# Patient Record
Sex: MALE | Race: WHITE | NOT HISPANIC OR LATINO | Employment: FULL TIME | URBAN - METROPOLITAN AREA
[De-identification: names, ages, dates, MRNs, and addresses within clinical notes are randomized per-mention and may not be internally consistent; named-entity substitution may affect disease eponyms.]

---

## 2015-06-30 LAB
ANION GAP SERPL CALCULATED.3IONS-SCNC: NORMAL MMOL/L
BUN SERPL-MCNC: 13 MG/DL (ref 8–25)
CALCIUM SERPL-MCNC: 9.5 MG/DL (ref 8.5–10.5)
CHLORIDE SERPLBLD-SCNC: 105 MMOL/L (ref 98–110)
CHOLESTEROL TOTAL: 187 (ref 100–199)
CO2 SERPL-SCNC: 26 MMOL/L (ref 21–31)
CREAT SERPL-MCNC: 1.04 MG/DL (ref 0.72–1.25)
GFR SERPL CREATININE-BSD FRML MDRD: NORMAL ML/MIN/1.73M2
GLUCOSE SERPL-MCNC: 95 MG/DL (ref 65–100)
HDLC SERPL-MCNC: 35 MG/DL
LDL CHOLESTEROL: 116 MG/DL
POTASSIUM SERPL-SCNC: 4.2 MMOL/L (ref 3.5–5)
SODIUM SERPL-SCNC: 139 MMOL/L (ref 135–145)
TRIGL SERPL-MCNC: 181 MG/DL

## 2016-09-06 LAB
ALBUMIN SERPL-MCNC: 4.4 G/DL (ref 3.5–5.2)
ALP SERPL-CCNC: 61 U/L (ref 50–136)
ALT SERPL-CCNC: 15 U/L (ref 8–45)
ANION GAP SERPL CALCULATED.3IONS-SCNC: NORMAL MMOL/L
AST SERPL-CCNC: 18 U/L (ref 2–40)
BILIRUB SERPL-MCNC: 0.7 MG/DL (ref 0.2–3.7)
BUN SERPL-MCNC: 16 MG/DL (ref 8–25)
CALCIUM SERPL-MCNC: 9.3 MG/DL (ref 8.5–10.5)
CHLORIDE SERPLBLD-SCNC: 107 MMOL/L (ref 98–110)
CO2 SERPL-SCNC: 25 MMOL/L (ref 21–31)
CREAT SERPL-MCNC: 0.96 MG/DL (ref 0.72–1.25)
GFR SERPL CREATININE-BSD FRML MDRD: NORMAL ML/MIN/1.73M2
GLUCOSE SERPL-MCNC: 90 MG/DL (ref 65–100)
POTASSIUM SERPL-SCNC: 4.1 MMOL/L (ref 3.5–5)
PROT SERPL-MCNC: 7.2 G/DL (ref 6–8)
SODIUM SERPL-SCNC: 139 MMOL/L (ref 135–145)

## 2016-10-26 LAB
CK TOTAL: 78 U/L (ref 30–200)
HCT VFR BLD AUTO: 42.9 % (ref 37–53)
HEMOGLOBIN: 14.6 G/DL (ref 13.5–17.5)
PLATELET # BLD AUTO: 177 10^9/L (ref 140–440)
WBC # BLD AUTO: 7.5 10^9/L (ref 4.5–11)

## 2017-10-23 ENCOUNTER — TRANSFERRED RECORDS (OUTPATIENT)
Dept: HEALTH INFORMATION MANAGEMENT | Facility: CLINIC | Age: 52
End: 2017-10-23

## 2017-11-06 ENCOUNTER — TRANSFERRED RECORDS (OUTPATIENT)
Dept: HEALTH INFORMATION MANAGEMENT | Facility: CLINIC | Age: 52
End: 2017-11-06

## 2017-11-07 ENCOUNTER — CARE COORDINATION (OUTPATIENT)
Dept: CARDIOLOGY | Facility: CLINIC | Age: 52
End: 2017-11-07

## 2017-11-07 NOTE — PROGRESS NOTES
Call from  Antonette - patient on the line asking if we need to obtain records for visit with DR Yeung Friday 11/10/2017 - seeing as new patient for chest pain.  Asked for patient to be transferred and evaluated by RN.  Patient was transferred - spoke with patient a.  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 Patient states this has been going on since September 2016 and he was evaluated by stress test at [facility] but not seen by a cardiologist. ON review dyana states his chest is aches most of the time, has daily chest pain tightness in neck and acid reflux - he takes an aspirin and notices it eases. Patient states he feels full after eating, denies caffeine use, notes symptoms worse at night where he just feels bad and aches all over, no syncope or presyncope.  Patient states BP yesterday at [facility] Dr Sherman in [place] was 139/94 and abdominal ultrasound was ordered - he is awaiting those results. Patient states awareness of need to seek emergency room care as symptoms warrant and agrees to same.  Will obtain records from [facility] and from [facility].  Informed patient via message ie [facility]D needs JI to send records.   Erin Moreira RN 11/07/17 12:05 PM

## 2017-11-10 ENCOUNTER — OFFICE VISIT (OUTPATIENT)
Dept: CARDIOLOGY | Facility: CLINIC | Age: 52
End: 2017-11-10
Payer: COMMERCIAL

## 2017-11-10 VITALS
HEART RATE: 82 BPM | SYSTOLIC BLOOD PRESSURE: 115 MMHG | DIASTOLIC BLOOD PRESSURE: 75 MMHG | WEIGHT: 181.1 LBS | HEIGHT: 67 IN | OXYGEN SATURATION: 100 % | BODY MASS INDEX: 28.43 KG/M2

## 2017-11-10 DIAGNOSIS — R07.89 ATYPICAL CHEST PAIN: Primary | ICD-10-CM

## 2017-11-10 DIAGNOSIS — I10 ESSENTIAL HYPERTENSION: ICD-10-CM

## 2017-11-10 PROCEDURE — 93000 ELECTROCARDIOGRAM COMPLETE: CPT | Performed by: INTERNAL MEDICINE

## 2017-11-10 PROCEDURE — 99204 OFFICE O/P NEW MOD 45 MIN: CPT | Mod: 25 | Performed by: INTERNAL MEDICINE

## 2017-11-10 RX ORDER — LISINOPRIL 10 MG/1
10 TABLET ORAL DAILY
COMMUNITY

## 2017-11-10 NOTE — LETTER
11/10/2017    Alban Pepe MD  [facility]   [address] [place] [address]    RE: Lopez Vergara       Dear Colleague,    I had the pleasure of seeing Lopez Vergara in the [facility].    REFERRING PROVIDER:  Dr. Alban Pepe MD       REASON FOR VISIT:  Chest pain.      The patient is new to Cardiology.  In the past, he has been seen by Cardiology at [facility] and [facility].  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  He is a lifelong never smoker, his BMI is 28.6 kg/m2, and he has optimally treated hypertension.      1. Chest pain.      The patient has had on-and-off chest pain for several years.  However, he feels they have become more frequent and noticeable over the last few months.  He describes a left-sided chest discomfort that is associated with tingling in the back of his neck as well as discomfort in his lower back that can come on at rest, but also with activity.  He states that when he has to run across sports fields with his camera, he feels more aware of mild dyspnea now than he used to in the past.  Sometimes he gets this chest discomfort with running, at other times not.  The symptoms can last for a few minutes up to several hours or all night.  Pertinently, the patient appeared very anxious and appeared to be under considerable stress.  When I asked him directly whether he was stressed, he denied stressors at work, relationships, financial, etc.  However, notably, two of his male colleagues at work recently had stress tests, which were reportedly normal, and had non-fatal myocardial infarctions within a week of them. He is .      In 2013, the patient had a normal exercise echocardiogram in the [employer] [facility] at a high workload (greater than 10 METs).  In 09/2016, at [facility], he had a normal treadmill exercise test at 12 METs with normal blood pressure and heart rate  response and no ischemic changes.  Pertinently, the report states that he had chest pain before and during the test.      Risk factors:  Age 52 years, never smoker, nonobese (BMI 28.6 kg/m2), mild hypertension treated with 10 mg of lisinopril with a resting BP of 115/75 mmHg, not diabetic, satisfactory lipid profile with an LDL of 116, low HDL of 35, triglycerides of 181.  Normal TSH of 1.57.  He does not know his family history because he is adopted.  His resting ECG done today showed normal sinus rhythm at a rate of 68 BPM.      CURRENT MEDICATIONS:   1.  Lisinopril 10 mg daily.   2.  Omeprazole 40 mg daily.   3.  Ferrous sulfate 325 mg daily.      ALLERGIES:  Hives with insulin.      REVIEW OF SYSTEMS:  Please see my attached note in Epic for a detailed review of systems, past medical, surgical, social and family histories.        PHYSICAL EXAMINATION:   VITAL SIGNS:  /75, pulse 82 per minute, height 1.695 m, weight 82.1 kg, saturations 100% on room air, BMI 28.58 kg/m2.   GENERAL:  Remarkable for a visibly anxious affect.  During the consultation, he became more relaxed as he was more reassured of his symptoms.   CARDIOVASCULAR:  Normal heart sounds.  Normal apical impulse.  No murmur, no pericardial friction rub.   LUNGS:  Bilateral normal breath sounds.   EXTREMITIES:  No edema.        Please see my attached note in Epic for detailed exam findings.        DIAGNOSTIC DATA:     LABORATORY:  As documented in HPI.        Echo stress test in 2013, treadmill stress test in 09/2016:  As documented in HPI.      ECG done today:  Normal sinus rhythm.      DIAGNOSES:   1.  Atypical chest pain.   2.  Optimally treated hypertension.   3.  Dyslipidemia with a low HDL of 35.      ASSESSMENT AND PLAN:    Mr. Vergara's chest pain sounds atypical for coronary etiology.  It occurs at rest and with exercise, can last for hours. He has had negative high workload stress tests in 2013 and 2016.      RECOMMENDATIONS:    I had a  long conversation with the patient reiterating the symptoms of coronary artery disease, the role of stress testing, the role of coronary angiogram.  The patient wanted to know his absolute risk for future heart attacks, and I told him that currently we do not have a test that can predict this.  I have offered him an exercise nuclear stress test or a CT coronary angiogram.  The details of both tests, the pros and cons and the prognostic value were discussed.  The patient would like to think this through before proceeding.  I have given him the contact details of my nurse, Erin Salmon, and he will call us with how he would like to proceed.      It was my pleasure to visit with this patient.  I spent 40 minutes with him; greater than 50% of the time was spent in counseling and coordination of care.     Sincerely,    Marcus Yeung MD     [facility]
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
## 2017-11-10 NOTE — PATIENT INSTRUCTIONS
1. As we discussed, if you would like to proceed with the treadmill nuclear stress test or CT coronary angiogram, please call my nurse and we will set it up for you. My office will call you with the results.    If you have any questions or concerns, please call my nurse Erin Salmon at [phone].

## 2017-11-10 NOTE — PROGRESS NOTES
REFERRING PROVIDER:  Dr. Alban Pepe MD       REASON FOR VISIT:  Chest pain.      HISTORY OF PRESENT ILLNESS:    The patient is new to Cardiology.  In the past, he has been seen by Cardiology at [facility] and [facility].  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  He is a lifelong never smoker, his BMI is 28.6 kg/m2, and he has optimally treated hypertension.      1. Chest pain.      The patient has had on-and-off chest pain for several years.  However, he feels they have become more frequent and noticeable over the last few months.  He describes a left-sided chest discomfort that is associated with tingling in the back of his neck as well as discomfort in his lower back that can come on at rest, but also with activity.  He states that when he has to run across sports fields with his camera, he feels more aware of mild dyspnea now than he used to in the past.  Sometimes he gets this chest discomfort with running, at other times not.  The symptoms can last for a few minutes up to several hours or all night.  Pertinently, the patient appeared very anxious and appeared to be under considerable stress.  When I asked him directly whether he was stressed, he denied stressors at work, relationships, financial, etc.  However, notably, two of his male colleagues at work recently had stress tests, which were reportedly normal, and had non-fatal myocardial infarctions within a week of them. He is .      In 2013, the patient had a normal exercise echocardiogram in the [employer] system at a high workload (greater than 10 METs).  In 09/2016, at [facility], he had a normal treadmill exercise test at 12 METs with normal blood pressure and heart rate response and no ischemic changes.  Pertinently, the report states that he had chest pain before and during the test.      Risk factors:  Age 52 years, never smoker, nonobese (BMI 28.6 kg/m2), mild hypertension  treated with 10 mg of lisinopril with a resting BP of 115/75 mmHg, not diabetic, satisfactory lipid profile with an LDL of 116, low HDL of 35, triglycerides of 181.  Normal TSH of 1.57.  He does not know his family history because he is adopted.  His resting ECG done today showed normal sinus rhythm at a rate of 68 BPM.      CURRENT MEDICATIONS:   1.  Lisinopril 10 mg daily.   2.  Omeprazole 40 mg daily.   3.  Ferrous sulfate 325 mg daily.      ALLERGIES:  Hives with insulin.      REVIEW OF SYSTEMS:  Please see my attached note in Epic for a detailed review of systems, past medical, surgical, social and family histories.        PHYSICAL EXAMINATION:   VITAL SIGNS:  /75, pulse 82 per minute, height 1.695 m, weight 82.1 kg, saturations 100% on room air, BMI 28.58 kg/m2.   GENERAL:  Remarkable for a visibly anxious affect.  During the consultation, he became more relaxed as he was more reassured of his symptoms.   CARDIOVASCULAR:  Normal heart sounds.  Normal apical impulse.  No murmur, no pericardial friction rub.   LUNGS:  Bilateral normal breath sounds.   EXTREMITIES:  No edema.        Please see my attached note in Epic for detailed exam findings.        DIAGNOSTIC DATA:     LABORATORY:  As documented in HPI.        Echo stress test in 2013, treadmill stress test in 09/2016:  As documented in HPI.      ECG done today:  Normal sinus rhythm.      DIAGNOSES:   1.  Atypical chest pain.   2.  Optimally treated hypertension.   3.  Dyslipidemia with a low HDL of 35.      ASSESSMENT AND PLAN:    Mr. Vergara's chest pain sounds atypical for coronary etiology.  It occurs at rest and with exercise, can last for hours. He has had negative high workload stress tests in 2013 and 2016.      RECOMMENDATIONS:    I had a long conversation with the patient reiterating the symptoms of coronary artery disease, the role of stress testing, the role of coronary angiogram.  The patient wanted to know his absolute risk for future heart  attacks, and I told him that currently we do not have a test that can predict this.  I have offered him an exercise nuclear stress test or a CT coronary angiogram.  The details of both tests, the pros and cons and the prognostic value were discussed.  The patient would like to think this through before proceeding.  I have given him the contact details of my nurse, Erin Salmon, and he will call us with how he would like to proceed.      It was my pleasure to visit with this patient.  I spent 40 minutes with him; greater than 50% of the time was spent in counseling and coordination of care.      cc:   Alban Pepe MD   [id number] [place] [address] LANNY FREY MD             D: 11/10/2017 13:51   T: 11/10/2017 14:44   MT: luis      Name:     PATRIC CORDERO   MRN:      [id number]        Account:      [id number]   :      1965           Service Date: 11/10/2017      Document: [id number]

## 2018-02-06 ENCOUNTER — CARE COORDINATION (OUTPATIENT)
Dept: CARDIOLOGY | Facility: CLINIC | Age: 53
End: 2018-02-06

## 2018-02-06 DIAGNOSIS — R07.89 ATYPICAL CHEST PAIN: Primary | ICD-10-CM

## 2018-02-06 NOTE — PROGRESS NOTES
Pt called, he saw Dr. Yeung 11/2017 for atypical chest pain. Pt states he continues to have chest pain and heart racing with exertion. Pt denies pain at rest. He states the pain is the same as it was when he saw Dr. Yeung 11/2017, it is just more consistent. Pt states he has also been having issues with blood in his stool, and Hgb down. He was recently in ER for chest pain and bloody stool at [facility]. He was told to follow up with Cardiology and GI. Pt has GI appt tomorrow. [facility]'s records unavailable. Pt states he will go and sign JI for us to get records. Did attempt to access records in Care Everywhere, but was unable to. Pt states at last visit with Dr. Yeung, they discussed treadmill nuclear stress test and CT coronary angiogram. Pt does not want to do stress test, but he would like to do the CT coronary angiogram and follow up with Dr. Yeung. Told pt I would place orders for CT coronary angiogram and then follow up with Dr. Yeung after to review test. Pt in agreement with plan. Orders placed. Messaged scheduling to call pt to review.     Of note, pt's HR in clinic on 11/10/17 was 82 bpm. Reviewed Care Everywhere, last HR on file from 1/25/18 was 94 bpm. Messaged Dr. Yeung to review order for Metoprolol prior to CT coronary angiogram. JOHN Tracey 1:52 PM 02/06/18

## 2018-02-12 RX ORDER — METOPROLOL TARTRATE 25 MG/1
25 TABLET, FILM COATED ORAL ONCE
Qty: 1 TABLET | Refills: 0 | Status: SHIPPED | OUTPATIENT
Start: 2018-02-12 | End: 2018-02-12

## 2018-02-12 NOTE — PROGRESS NOTES
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 Patient states worsened symptoms over the last several weeks including Shortness of breath during extreme exercise -  Symptoms as described in previous call 2/6 to another nurse. Patient does not wish to limit exercise as he states he has not told his work has been having symptoms, does not wish to refrain from work, does not wish to go to the emergency room for evaluation. Attempted to move up CTa - no openning prior to Monday. Offered to see an MD for reevaluation and declined. Reviewed with Dr Yeung - plan to keep CTa as scheduled Monday and add D dimer lab, take metoprolol 25 mg 1 hour prior to CTa test - Updated patient who agrees to plan and Rx sent to pharmacy for pickup.  Erin Moreira RN 02/12/18 11:23 AM

## 2018-02-12 NOTE — PROGRESS NOTES
Call from patient stating he went to Primary MD and had tests today including labs.  Patient voiced concerns over cost of healthcare and his high deductible of $3000. Patient states his Primary asked him to inquire on cost of CTangiogram and he check with [facility] and was quoted $200. Reviewed with patient difference between a CT calcuim score and a CT angiogram. Patient states he is not sure what he is going to do as far as testing - he will keep appointment scheduled Monday and let us know if he decides on cancelling.   Erin Moreira RN 02/12/18 4:17 PM    Call back from patient stating he spoke with his insurance [employer] at [employer] and was told he receives a discount contract price for having CTangiogram testing at [facility] and asks we send over order.  Spoke with scheduling - they will fax order and patient will call if he wishes to cancel CTa here after he pursues this further.  Erin Moreira RN 02/12/18 4:53 PM

## 2018-02-13 NOTE — PROGRESS NOTES
Received VM from pt stating [facility] had not received order from Dr. Yeung for CTA and requested one be faxed asap to [phone], and call to him to confirm.    Faxed CTA order to number above. Reviewed this w/ pt. He'd like to keep CTA scheduled w/ our clinic 2/19 until he ensures test is scheduled at [facility]. He wishes to f/u w/ Dr. Yeung and has f/u scheduled 3/2.     He reports that he met w/ PCP yesterday and Hgb had dropped, and he was rec'd to have upper and lower GI imaging. I rec'd he cont close f/u w/ PCP and GI as rec'd.  Notes in Care Everywhere stating Hgb down to 9.1 and PCP ordered urgent endoscopy and colonoscopy, increased Fe, recheck Hgb 1 wk. PCP and pt ? If bleeding source could be multiple bleeding internal hemorrhoids.     FYI to Dr. Yeung.     Hanna Henley [facility] RN 11:31 AM 02/13/18  [phone]

## 2019-03-18 ENCOUNTER — DOCUMENTATION ONLY (OUTPATIENT)
Dept: CARDIOLOGY | Facility: CLINIC | Age: 54
End: 2019-03-18

## 2019-03-18 NOTE — LETTER
March 18, 2019       TO: Lopez Vergara  [address] [facility] [address]       Dear Lopez Vergara,    You have outstanding orders for an office visit with your cardiologist and blood work.     Please call our clinic at [phone] to schedule your appointment as soon as possible.    Thank you,    [facility]

## 2019-03-26 ENCOUNTER — COMMUNICATION - HEALTHEAST (OUTPATIENT)
Dept: SCHEDULING | Facility: CLINIC | Age: 54
End: 2019-03-26

## 2021-05-26 ENCOUNTER — RECORDS - HEALTHEAST (OUTPATIENT)
Dept: ADMINISTRATIVE | Facility: CLINIC | Age: 56
End: 2021-05-26

## 2021-05-27 NOTE — TELEPHONE ENCOUNTER
In 3 months ago with chest pain and elevated bp. Has had angiograms since then and they have never figured out what is going on.     Patient states he does have these chest pains frequently. Rates chest pain 3/10, started at 6pm, some sharper pains, constant, sharp pains have stopped    BP was good this morning   Around 6 pm it was 170/110, took another bp med  156/105 at 0130  179/110 now (0156)    No dizziness  No nausea/vomiting  No vomiting  No cough  No sweating  No fever     Tingling in left foot    Patient is very frustrated because no one has figured this out yet.     Triaged to a disposition of Go To ED Now. Patient is agreeable    Reason for Disposition    [1] Systolic BP  >= 160 OR Diastolic >= 100 AND [2] cardiac or neurologic symptoms (e.g., chest pain, difficulty breathing, unsteady gait, blurred vision)    Protocols used: HIGH BLOOD PRESSURE-SAMY-FATMATA Mccracken RN Triage Nurse Advisor [facility]

## 2022-12-07 NOTE — MR AVS SNAPSHOT
"              After Visit Summary   11/10/2017    Lopez Vergara    MRN: [id number]           Patient Information     Date Of Birth          1965        Visit Information        Provider Department      11/10/2017 12:45 PM Marcus Yeung MD [facility]   [place]        Today's Diagnoses     Atypical chest pain    -  1    Essential hypertension          Care Instructions    1. As we discussed, if you would like to proceed with the treadmill nuclear stress test or CT coronary angiogram, please call my nurse and we will set it up for you. My office will call you with the results.    If you have any questions or concerns, please call my nurse Erin Salmon at [phone].            Follow-ups after your visit        Who to contact     If you have questions or need follow up information about today's clinic visit or your schedule please contact [facility]   [place]ANIE directly at [phone].  Normal or non-critical lab and imaging results will be communicated to you by Doculynxhart, letter or phone within 4 business days after the clinic has received the results. If you do not hear from us within 7 days, please contact the clinic through Doculynxhart or phone. If you have a critical or abnormal lab result, we will notify you by phone as soon as possible.  Submit refill requests through Shopistan or call your pharmacy and they will forward the refill request to us. Please allow 3 business days for your refill to be completed.          Additional Information About Your Visit        Doculynxhart Information     Shopistan lets you send messages to your doctor, view your test results, renew your prescriptions, schedule appointments and more. To sign up, go to [email or web address] . Click on \"Log in\" on the left side of the screen, which will take you to the Welcome page. Then click on \"Sign up Now\" on the right side of the page.     You will be asked to enter the access code " "listed below, as well as some personal information. Please follow the directions to create your username and password.     Your access code is: [email or web address]  Expires: 2018  1:36 PM     Your access code will  in 90 days. If you need help or a new code, please call your [facility] clinic or [phone].        Care EveryWhere ID     This is your Care EveryWhere ID. This could be used by other organizations to access your [facility] medical records  [id number]        Your Vitals Were     Pulse Height Pulse Oximetry BMI (Body Mass Index)          82 1.695 m (5' 6.75\") 100% 28.58 kg/m2         Blood Pressure from Last 3 Encounters:   11/10/17 115/75   10/29/14 (!) 128/91   14 128/81    Weight from Last 3 Encounters:   11/10/17 82.1 kg (181 lb 1.6 oz)   10/29/14 81.6 kg (180 lb)   14 81.6 kg (180 lb)              We Performed the Following     EKG 12-lead complete w/read - Clinics          Today's Medication Changes          These changes are accurate as of: 11/10/17  1:36 PM.  If you have any questions, ask your nurse or doctor.               Stop taking these medicines if you haven't already. Please contact your care team if you have questions.     PARoxetine 10 MG tablet   Commonly known as:  PAXIL   Stopped by:  Marcus Yeung MD                    Primary Care Provider    Physician No Ref-Primary       NO REF-PRIMARY PHYSICIAN        Equal Access to Services     [facility] AH: Hadii nate ku hadasho Soomaali, waaxda luqadaha, qaybta kaalmada adeegyaelie, waxlaura taya silva . So [facility] [phone].    ATENCIÓN: Si habla español, tiene a macias disposición servicios gratuitos de asistencia lingüística. Llame al [phone].    We comply with applicable federal civil rights laws and [place] laws. We do not discriminate on the basis of race, color, national origin, age, disability, sex, sexual orientation, or gender identity.            Thank you!     Thank you for choosing " Biopsy Type: Frozen Section [facility]  for your care. Our goal is always to provide you with excellent care. Hearing back from our patients is one way we can continue to improve our services. Please take a few minutes to complete the written survey that you may receive in the mail after your visit with us. Thank you!             Your Updated Medication List - Protect others around you: Learn how to safely use, store and throw away your medicines at www.disposemymeds.org.          This list is accurate as of: 11/10/17  1:36 PM.  Always use your most recent med list.                   Brand Name Dispense Instructions for use Diagnosis    ferrous sulfate 325 (65 FE) MG tablet    IRON     1 TABLET DAILY        lisinopril 10 MG tablet    PRINIVIL/ZESTRIL     Take 10 mg by mouth daily        omeprazole 40 MG capsule    priLOSEC    30 capsule    Take 1 capsule (40 mg) by mouth daily Take 30-60 minutes before a meal.    Atypical chest pain